# Patient Record
Sex: FEMALE | URBAN - METROPOLITAN AREA
[De-identification: names, ages, dates, MRNs, and addresses within clinical notes are randomized per-mention and may not be internally consistent; named-entity substitution may affect disease eponyms.]

---

## 2020-09-28 ENCOUNTER — NURSE TRIAGE (OUTPATIENT)
Dept: OTHER | Facility: OTHER | Age: 45
End: 2020-09-28

## 2020-09-28 DIAGNOSIS — Z20.828 EXPOSURE TO SARS-ASSOCIATED CORONAVIRUS: Primary | ICD-10-CM

## 2020-09-28 DIAGNOSIS — Z20.828 EXPOSURE TO SARS-ASSOCIATED CORONAVIRUS: ICD-10-CM

## 2020-09-28 PROCEDURE — U0003 INFECTIOUS AGENT DETECTION BY NUCLEIC ACID (DNA OR RNA); SEVERE ACUTE RESPIRATORY SYNDROME CORONAVIRUS 2 (SARS-COV-2) (CORONAVIRUS DISEASE [COVID-19]), AMPLIFIED PROBE TECHNIQUE, MAKING USE OF HIGH THROUGHPUT TECHNOLOGIES AS DESCRIBED BY CMS-2020-01-R: HCPCS | Performed by: FAMILY MEDICINE

## 2020-09-28 NOTE — TELEPHONE ENCOUNTER
Regarding: DBEDX-06; SYMPTOMATIC; EXPOSURE 1 of 2  ----- Message from Harish Moreno sent at 9/28/2020 10:50 AM EDT -----  Patient calling because her sons best friends dad tested positive  Patient has sore throat, cough, headaches

## 2020-09-28 NOTE — TELEPHONE ENCOUNTER
Reason for Disposition   [1] COVID-19 infection diagnosed or suspected AND [2] mild symptoms (fever, cough) AND [3] no trouble breathing or other complications    Answer Assessment - Initial Assessment Questions  1  COVID-19 DIAGNOSIS: "Who made your Coronavirus (COVID-19) diagnosis?" "Was it confirmed by a positive lab test?" If not diagnosed by a HCP, ask "Are there lots of cases (community spread) where you live?" (See public health department website, if unsure)    * MAJOR community spread: high number of cases; numbers of cases are increasing; many people hospitalized  * MINOR community spread: low number of cases; not increasing; few or no people hospitalized      [place]  2  ONSET: "When did the COVID-19 symptoms start?"       Started 09/22/2020  3  WORST SYMPTOM: "What is your worst symptom?" (e g , cough, fever, shortness of breath, muscle aches)      Sore throat  4  COUGH: "Do you have a cough?" If so, ask: "How bad is the cough?"        Dry, hacking cough  5  FEVER: "Do you have a fever?" If so, ask: "What is your temperature, how was it measured, and when did it start?"      [place]  6  RESPIRATORY STATUS: "Describe your breathing?" (e g , shortness of breath, wheezing, unable to speak)       "It's not normal "  7  BETTER-SAME-WORSE: "Are you getting better, staying the same or getting worse compared to yesterday?"  If getting worse, ask, "In what way?"      Worse, HA  8  HIGH RISK DISEASE: "Do you have any chronic medical problems?" (e g , asthma, heart or lung disease, weak immune system, etc )      Healthy  9  PREGNANCY: "Is there any chance you are pregnant?" "When was your last menstrual period?"      Denies, LMP just started cycle  10  OTHER SYMPTOMS: "Do you have any other symptoms?"  (e g , runny nose, headache, sore throat, loss of smell)        HA, sore throat, cough, and slight diarrhea      Protocols used: CORONAVIRUS (YKVEM-86) - DIAGNOSED OR SUSPECTED-ADULT-

## 2020-09-29 LAB — SARS-COV-2 RNA SPEC QL NAA+PROBE: NOT DETECTED

## 2024-04-30 ENCOUNTER — OFFICE VISIT (OUTPATIENT)
Dept: BARIATRICS | Facility: CLINIC | Age: 49
End: 2024-04-30
Payer: COMMERCIAL

## 2024-04-30 VITALS
SYSTOLIC BLOOD PRESSURE: 132 MMHG | HEIGHT: 64 IN | OXYGEN SATURATION: 97 % | RESPIRATION RATE: 18 BRPM | HEART RATE: 86 BPM | BODY MASS INDEX: 30.15 KG/M2 | WEIGHT: 176.6 LBS | DIASTOLIC BLOOD PRESSURE: 84 MMHG

## 2024-04-30 DIAGNOSIS — K90.9 DIARRHEA DUE TO MALABSORPTION: ICD-10-CM

## 2024-04-30 DIAGNOSIS — R19.7 DIARRHEA DUE TO MALABSORPTION: ICD-10-CM

## 2024-04-30 DIAGNOSIS — E66.9 OBESITY, CLASS I, BMI 30-34.9: Primary | ICD-10-CM

## 2024-04-30 PROCEDURE — 99204 OFFICE O/P NEW MOD 45 MIN: CPT

## 2024-04-30 RX ORDER — OMEPRAZOLE 20 MG/1
CAPSULE, DELAYED RELEASE ORAL
COMMUNITY
Start: 2024-04-26

## 2024-04-30 RX ORDER — MELOXICAM 15 MG/1
15 TABLET ORAL DAILY
COMMUNITY
Start: 2024-04-26

## 2024-04-30 NOTE — ASSESSMENT & PLAN NOTE
Discussed options of HealthyCORE, VLCD, and Conservative Program and the role of weight loss medications.   Discussed the importance of making healthy lifestyle changes with anti-obesity medications.   Patient is unsure of what program to pick at this time.  Initial weight loss goal of 5-10% weight loss for improved health  Discussed how weight loss can improve co-morbid conditions and or prevent weight-related complications.   Patient lifestyle habits were reviewed and barriers to weight loss were addressed today.   Discussed with patient portion control as well as maintaining a well balanced diet with adequate protein and fiber throughout the day.    Patient was encouraged to start a regular exercise routine which may involve walking or light strength training for at least 30 minutes   Labs reviewed    Phentermine: Discussed.  Wellbutrin/Naltrexone: Discussed.  Topamax: Discussed.  GLP-1 agonists: Discussed, may not be a good option due to history of chronic diarrhea and loose stools. Patient states she had colonoscopy and was placed on a medication to help with loose stools, but isn't currently taking the medication. She is following up with doctor regarding GI symptoms.     Patient denies personal and family history of  pancreatitis, thyroid cancer, MEN-2 tumors.  Denies any hx of glaucoma, seizures, kidney stones, or gastroparesis. S/P Cholecystectomy. + chronic diarrhea.  Denies Hx of CAD, PAD, palpitations, arrhythmia.   Denies uncontrolled anxiety or depression, suicidal behavior or thinking.   Denies insomnia or sleep disturbance.     Patient states she is perimenopausal based on her symptoms. Patient states irregular menstrual cycle. Patient is currently not using birth control and is sexually active.      Discussed medication options. Patient wanted to try GLP-1 discussed with patient the GI side effects of GLP-1 medications and it's not the best option based on her GI symptoms. Can add as a possibility  after following up with a provider. Offered other medication options to patient, along with meeting with a registered dietician to help her with a successful weight loss. Patient is  undecided at this time.    Patient is concerned about fasting blood glucose level of 109 mg/dl. Patient states her provider ordered a A1C and results are pending. Patient has a diet heavy in carbohydrates and fruits. Discussed and counseled patient on moderation of carbohydrates and fruits to help improve blood glucose levels. Advised patient she should have at least one meeting with registered dietician so she can learn a balanced diet that can help her with successful weight loss and help prevent comorbidities. Patient would not like to meet with registered dietician at this time.     Gave patient handouts on healthy habits, portion control, 1,200 meal plan, healthy snacks, lean protein sources, anti-obesity medication information, and additional resources. Advised patient to start calorie tracking, and making healthier food alternatives discussed.     Daily Calorie Goal: 1,200    Recommendations:  Nutrition:  Don't skip meals, eat at least three times per day.   Pay attention to your body. When you feel like you have enough to eat, stop before feeling full.   Pick lean proteins, low-fat or nonfat options, get plenty of fiber.    Food log to keep track of your daily caloric intake.         *Food log (ie.) www.[employer].com, sparkpeople.com, loseit.com, calorieking.com, etc.    Hydration:    Drink at least 64oz of water daily.  Try to avoid sugary drinks, or soda. If you need some flavor you can add some berries, or slices of lime, lemon, or cucumber to water.    Exercise:  Increase physical activity by 10 minutes daily. Gradually increase physical activity to a goal of 5 days a week for 30 minutes or a total of 150 minutes of moderate intensity aerobic exercise with at least 2 days a week of muscle strengthening exercises.

## 2024-04-30 NOTE — PROGRESS NOTES
Assessment/Plan:    Obesity, Class I, BMI 30-34.9  Discussed options of HealthyCORE, VLCD, and Conservative Program and the role of weight loss medications.   Discussed the importance of making healthy lifestyle changes with anti-obesity medications.   Patient is unsure of what program to pick at this time.  Initial weight loss goal of 5-10% weight loss for improved health  Discussed how weight loss can improve co-morbid conditions and or prevent weight-related complications.   Patient lifestyle habits were reviewed and barriers to weight loss were addressed today.   Discussed with patient portion control as well as maintaining a well balanced diet with adequate protein and fiber throughout the day.    Patient was encouraged to start a regular exercise routine which may involve walking or light strength training for at least 30 minutes   Labs reviewed    Phentermine: Discussed.  Wellbutrin/Naltrexone: Discussed.  Topamax: Discussed.  GLP-1 agonists: Discussed, may not be a good option due to history of chronic diarrhea and loose stools. Patient states she had colonoscopy and was placed on a medication to help with loose stools, but isn't currently taking the medication. She is following up with doctor regarding GI symptoms.     Patient denies personal and family history of  pancreatitis, thyroid cancer, MEN-2 tumors.  Denies any hx of glaucoma, seizures, kidney stones, or gastroparesis. S/P Cholecystectomy. + chronic diarrhea.  Denies Hx of CAD, PAD, palpitations, arrhythmia.   Denies uncontrolled anxiety or depression, suicidal behavior or thinking.   Denies insomnia or sleep disturbance.     Patient states she is perimenopausal based on her symptoms. Patient states irregular menstrual cycle. Patient is currently not using birth control and is sexually active.      Discussed medication options. Patient wanted to try GLP-1 discussed with patient the GI side effects of GLP-1 medications and it's not the best option  based on her GI symptoms. Can add as a possibility after following up with a provider. Offered other medication options to patient, along with meeting with a registered dietician to help her with a successful weight loss. Patient is  undecided at this time.    Patient is concerned about fasting blood glucose level of 109 mg/dl. Patient states her provider ordered a A1C and results are pending. Patient has a diet heavy in carbohydrates and fruits. Discussed and counseled patient on moderation of carbohydrates and fruits to help improve blood glucose levels. Advised patient she should have at least one meeting with registered dietician so she can learn a balanced diet that can help her with successful weight loss and help prevent comorbidities. Patient would not like to meet with registered dietician at this time.     Gave patient handouts on healthy habits, portion control, 1,200 meal plan, healthy snacks, lean protein sources, anti-obesity medication information, and additional resources. Advised patient to start calorie tracking, and making healthier food alternatives discussed.     Daily Calorie Goal: 1,200    Recommendations:  Nutrition:  Don't skip meals, eat at least three times per day.   Pay attention to your body. When you feel like you have enough to eat, stop before feeling full.   Pick lean proteins, low-fat or nonfat options, get plenty of fiber.    Food log to keep track of your daily caloric intake.         *Food log (ie.) www.[employer].com, sparkpeople.com, loseit.com, calorieking.com, etc.    Hydration:    Drink at least 64oz of water daily.  Try to avoid sugary drinks, or soda. If you need some flavor you can add some berries, or slices of lime, lemon, or cucumber to water.    Exercise:  Increase physical activity by 10 minutes daily. Gradually increase physical activity to a goal of 5 days a week for 30 minutes or a total of 150 minutes of moderate intensity aerobic exercise with at least 2 days  a week of muscle strengthening exercises.            Jn was seen today for consult.    Diagnoses and all orders for this visit:    Obesity, Class I, BMI 30-34.9    Diarrhea due to malabsorption        Total time spent reviewing chart, interviewing patient, examining patient, discussing plan, answering all questions, and documentin min, with >50% face-to-face time spent counseling patient on nonsurgical interventions for the treatment of excess weight. Discussed in detail nonsurgical options including intensive lifestyle intervention program, very low-calorie diet program and conservative program.  Discussed the role of weight loss medications.  Counseled patient on diet behavior and exercise modification for weight loss.    Follow up in approximately as needed.     Subjective:   Chief Complaint   Patient presents with    Consult     Initial Consultation. SB =  /8.  Waist; 38.25       Patient ID: Jn Phillips  is a 48 y.o. female with excess weight/obesity here to pursue weight management.  Previous notes and records have been reviewed.    History reviewed. No pertinent past medical history.  Past Surgical History:   Procedure Laterality Date    CHOLECYSTECTOMY         HPI:  Wt Readings from Last 20 Encounters:   24 80.1 kg (176 lb 9.6 oz)       Patient presents today to medical weight management office for consult. Patient states she noticed her weight has increased over the last three years. Patient states she has lost 10 pounds last year from adjusting her diet and exercising but recently gained the weight back. Patient states she suffers from chronic loose stools or diarrhea. She is following up with her primary provider. Patient states she is looking for assistance to help her loose weight in order to prevent comorbidities.     Obesity/Excess Weight:  Severity: Moderate  Onset:  last three years, graduall    Modifiers: Diet and Exercise  Contributing factors: Poor Food Choices, Insufficient Physical  Activity, Lack of knowledge of appropriate lifestyle changes, and Insufficient time to make appropriate lifestyle changes  Associated symptoms: body image issues, decreased self esteem, and clothes do not fit    Diet recall:  B: bagel, peanut butter, coffee- 2 % milk honey 12 ounces  S: skip  L: noodles, egg, ground pork, or beef  S: dry peanuts, reggie   D: tofu, mushroom, noodles, rice  S: fruit, nuts, sunflower seeds    Hydration: 16 ounces water, 12 ounces of coffee, or tea   Alcohol: socially- goes out to drink at least 2 times a week, can have 3-5 shots.   Smoking: no  Exercise: Yoga once a week.   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   Sleep: 6 hours of sleep  STOP ban/8    Current weight: 176 lbs   Current BMI: 30.76  Current Waist Measurement: 38.25  Goal weight: 150-160    Colonoscopy-to be scheduled.   Mammogram:     The following portions of the patient's history were reviewed and updated as appropriate: allergies, current medications, past family history, past medical history, past social history, past surgical history, and problem list.    Family History   Problem Relation Age of Onset    Colon cancer Mother     Diabetes Father     Kidney disease Father         Review of Systems   Constitutional:  Negative for chills and fever.   HENT:  Negative for ear pain and sore throat.    Eyes:  Negative for pain and visual disturbance.   Respiratory:  Negative for cough and shortness of breath.    Cardiovascular:  Negative for chest pain and palpitations.   Gastrointestinal:  Positive for diarrhea (chronic). Negative for abdominal pain and vomiting.   Genitourinary:  Negative for dysuria and hematuria.   Musculoskeletal:  Negative for arthralgias and back pain.   Skin:  Negative for color change and rash.   Neurological:  Negative for seizures and syncope.   All other systems reviewed and are negative.      Objective:  /84 (BP Location: Left arm, Patient Position: Sitting, Cuff Size:  "Adult)   Pulse 86   Resp 18   Ht 5' 3.5\" (1.613 m)   Wt 80.1 kg (176 lb 9.6 oz)   SpO2 97%   BMI 30.79 kg/m²     Physical Exam  Constitutional:       Appearance: Normal appearance. She is obese.   HENT:      Head: Normocephalic and atraumatic.   Cardiovascular:      Rate and Rhythm: Normal rate.   Pulmonary:      Effort: No respiratory distress.   Chest:      Chest wall: No tenderness.   Abdominal:      General: There is distension.      Palpations: Abdomen is soft.   Musculoskeletal:         General: Normal range of motion.      Cervical back: Normal range of motion.   Neurological:      General: No focal deficit present.      Mental Status: She is alert and oriented to person, place, and time.   Psychiatric:         Mood and Affect: Mood normal.         Behavior: Behavior normal.         Thought Content: Thought content normal.         Judgment: Judgment normal.              Labs and Imaging  Recent labs and imaging have been personally reviewed.  No results found for: \"WBC\", \"HGB\", \"HCT\", \"MCV\", \"PLT\"  Lab Results   Component Value Date    SODIUM 141 04/26/2024    K 4.4 04/26/2024     04/26/2024    CO2 25 04/26/2024    AGAP 10 04/26/2024    BUN 16 04/26/2024    CREATININE 0.71 04/26/2024    GLUC 109 (H) 04/26/2024    CALCIUM 9.1 04/26/2024    AST 15 04/26/2024    ALT 25 04/26/2024    ALKPHOS 60 04/26/2024    TP 7.4 04/26/2024    TBILI 0.3 04/26/2024    EGFR 104 04/26/2024     No results found for: \"HGBA1C\"  Lab Results   Component Value Date    TSH 2.34 12/13/2023     No results found for: \"CHOLESTEROL\"  No results found for: \"HDL\"  Lab Results   Component Value Date    TRIG 226 (H) 05/04/2023     No results found for: \"LDLCALC\"   "

## 2024-05-01 NOTE — PROGRESS NOTES
Weight Management Medical Nutrition Assessment  Jn presented for a body stats package. Today's weight is 174.3#. She lost 1.7# since provider visit. Per dietary recall patient consumes excess calories from snacking portions. Since provider visit reduced carb intake. Not tracking calories at this time. Reports boredom hunger at night. Discussed adding a protein to night time snack for satisfaction. If not hungry recommended engaging in activity she enjoys. Addressed patient questions related to physical activity, portion sizes, and lean proteins. Provided visual guide for portion size recommendations and lean protein handout. Due to time unable to complete plan meal. Provided list of 300-400 calorie meal ideas. Provided protein goal. Patient plans to track protein intake.    Completed a body composition using SECA scale and reviewed results with patient. Reevue indirect calorimter revealed REE is fast          compared to the predictive normal for someone her same age, height, and gender.  Reevue Indirect Calorimeter REE:  1872          Weight loss without exercise: 4240-4940          Weight loss with exercise: 8178-2981                     Maintenance:   3158-6765           Patient seen by Medical Provider in past 6 months:  yes on 4/30/24  Requested to schedule appointment with Medical Provider: No      Anthropometric Measurements  Start Weight (#): 176# on 4/20/24  Current Weight (#): 174.3#  TBW % Change from start weight: 0.1%  Ideal Body Weight (#): 117.5#  Goal Weight (#): 150-160#  Highest: unable to assess due to time  Lowest: unable to assess due to time    Weight Loss History  Previous weight loss attempts: Diet, Exercise    Food and Nutrition Related History  Wake up: 5:30AM   Bed Time: 11:30PM    Food Recall  B: bagel, peanut butter, coffee- 2 % milk honey 12 ounces OR cereal + milk + egg  S: fruit OR 1/2 pancake  L: shrimp or beef + fried vegetables + brown rice (1/2 cup)  S: fruit  D: 1/2 cup salmon  + vegetables  S: sunflower seeds OR 1/2 cup peanuts    Beverages: 32oz water, 12oz coffee or tea  Alcohol: socially, at least twice per week  Volume of beverage intake: at least 32oz    Weekends: golfs with son, might eat more at night due to activity  Cravings: start at night, will snack on nuts    Trouble area of day: night time snacking due to boredom    Frequency of Eating out: reduced since medical provider visit  Food restrictions: does not eat cheese or dessert  Cooking: self   Food Shopping: self    Physical Activity Intake  Activity: Gym (cardio- walking, strength training), pickle  ball, barre class, golf (walks course)  Frequency: twice per week  Physical limitations/barriers to exercise: heel pain, on medication    Estimated Needs  Energy  SECA: BMR: 1488      X 1.3 -1000 =  [place] [address] [place] Energy Needs: BMR : 1405   1-2# loss weekly sedentary:  686-1186             1-2# loss weekly lightly active: 932-1432  Maintenance calories for sedentary activity level: 1686  Protein:64-80g      (1.2-1.5g/kg IBW)  Fluid: 63oz     (35mL/kg IBW)    Nutrition Diagnosis  Yes;    Excessive energy intake  related to Food and nutrition related knowledge deficit concerning energy intake as evidenced by  BMI >25 for adults       Nutrition Intervention    Nutrition Prescription  Calories: 1200 calories (+200-300 calories when active)  Protein: 64-80g  Fluid: 63oz    Meal Plan (Donald/Pro/Carb) Unable to complete meal plan due to limited time. Provided protein goal recommendations and list of 300-400 calorie meals.  Breakfast:  Snack:  Lunch:  Snack:  Dinner:  Snack:    Nutrition Education:    Calorie controlled menu  Lean protein food choices  Healthy snack options  Portion sizes      Nutrition Counseling:  Strategies: meal planning, portion sizes, healthy snack choices, hydration, fiber intake, protein intake, exercise, food journal      Monitoring and Evaluation:  Evaluation criteria:  Energy Intake  Meet protein  needs  Maintain adequate hydration  Monitor weekly weight  Meal planning/preparation  Food journal   Decreased portions at mealtimes and snacks  Physical activity     Barriers to learning:none  Readiness to change: Preparation:  (Getting ready to change)   Comprehension: good  Expected Compliance: good

## 2024-05-10 ENCOUNTER — TELEPHONE (OUTPATIENT)
Dept: BARIATRICS | Facility: CLINIC | Age: 49
End: 2024-05-10

## 2024-05-10 ENCOUNTER — CLINICAL SUPPORT (OUTPATIENT)
Dept: BARIATRICS | Facility: CLINIC | Age: 49
End: 2024-05-10

## 2024-05-10 VITALS — WEIGHT: 174.3 LBS | HEIGHT: 64 IN | BODY MASS INDEX: 29.76 KG/M2

## 2024-05-10 DIAGNOSIS — R63.5 ABNORMAL WEIGHT GAIN: Primary | ICD-10-CM

## 2024-05-10 PROCEDURE — RECHECK

## 2024-05-10 PROCEDURE — BODSTAT PR BODY STAT
